# Patient Record
(demographics unavailable — no encounter records)

---

## 2025-04-23 NOTE — ADDENDUM
[FreeTextEntry1] : Documented by Dontae House acting as a scribe for Dr. Méndez and Christopher Perrin PA-C on 04/23/2025 and was presence for the following sections: Physical Exam; Data Reviewed; Assessment; Discussion/Summary. All medical record entries made by the Scribe were at my, Dr. Méndez, and Christopher Perrin, direction and personally dictated by me on 04/23/2025. I have reviewed the chart and agree that the record accurately reflects my personal performance of the history, physical exam, procedure and imaging. No (0)

## 2025-04-23 NOTE — DISCUSSION/SUMMARY
[de-identified] : Reviewed all X Ray images with patient today and interpretation was provided. Today MRI of left knee to eval proximal fibular head stress fracture. Advised patient to wear knee compression sleeve. Patient will follow up after MRI.     ----------------------------------------------- Home Exercise The patient is instructed on a home exercise program.   GIOVANNI YOUNG Acting as a Scribe for Dr. Dev MORENO, Giovanni Young, attest that this documentation has been prepared under the direction and in the presence of Provider Dr. Méndez.   Activity Modification The patient was advised to modify their activities.   Dx / Natural History The patient was advised of the diagnosis. The natural history of the pathology was explained in full to the patient in layman's terms. Several different treatment options were discussed and explained in full to the patient including the risks and benefits of both surgical and non-surgical treatments.  All questions and concerns were answered.   Pain Guide Activities The patient was advised to let pain guide the gradual advancement of activities.   REAGAN MORENO explained to the patient that rest, ice, compression, and elevation would benefit them. They may return to activity after follow-up or when they no longer have any pain.   The patient's current medication management of their orthopedic diagnosis was reviewed today: (1) We discussed a comprehensive treatment plan that included possible pharmaceutical management involving the use of prescription strength medications including but not limited to options such as oral Naprosyn 500mg BID, once daily Meloxicam 15 mg, or 500-650 mg Tylenol versus over the counter oral medications and topical prescription NSAID Pennsaid vs over the counter Voltaren gel. (2) There is a moderate risk of morbidity with further treatment, especially from use of prescription strength medications and possible side effects of these medications which include upset stomach with oral medications, skin reactions to topical medications and cardiac/renal issues with long term use. (3) I recommended that the patient follow-up with their medical physician to discuss any significant specific potential issues with long term medication use such as interactions with current medications or with exacerbation of underlying medical comorbidities. (4) The benefits and risks associated with use of injectable, oral or topical, prescription and over the counter anti-inflammatory medications were discussed with the patient. The patient voiced understanding of the risks including but not limited to bleeding, stroke, kidney dysfunction, heart disease, and were referred to the black box warning label for further information.

## 2025-04-23 NOTE — DATA REVIEWED
[FreeTextEntry1] : 4/23/25 OC X-RAY Left Knee 4 views: This scan was reviewed and interpreted by Dr. Méndez, and his findings are- patella hernando, otherwise unremarkable

## 2025-04-23 NOTE — HISTORY OF PRESENT ILLNESS
[de-identified] : The patient is a 59 year old RIGHT hand dominant female who presents today complaining of left lateral knee pain.   Date of Injury/Onset: end of 2024 Pain:    At Rest: 7/10  With Activity:  7/10  Mechanism of injury: NKI.  This is NOT a Work Related Injury being treated under Worker's Compensation. This is NOT an athletic injury occurring associated with an interscholastic or organized sports team. Quality of symptoms: achy, sore  Improves with: heat, elevation Worse with: sit to stand, pressure, extending Prior treatment: no Prior Imaging: no Out of work/sport: no School/Sport/Position/Occupation: [employer] Reviewer Additional Information: None

## 2025-04-28 NOTE — HISTORY OF PRESENT ILLNESS
[de-identified] : The patient is a 59 year old RIGHT hand dominant female who presents today complaining of left lateral knee pain.   Date of Injury/Onset: end of 2024 Pain:    At Rest: 7/10  With Activity:  7/10  Mechanism of injury: NKI.  This is NOT a Work Related Injury being treated under Worker's Compensation. This is NOT an athletic injury occurring associated with an interscholastic or organized sports team. Quality of symptoms: achy, sore  Improves with: heat, elevation Worse with: sit to stand, pressure, extending Treatment/imaging: MRI @ [facility]  Out of work/sport: no School/Sport/Position/Occupation: [employer] Reviewer Changes since last visit: No changes in pain and symptoms since last visit Additional Information: None

## 2025-04-28 NOTE — DISCUSSION/SUMMARY
[de-identified] :  Reviewed all MRI images with patient, interpretation was provided. Physical therapy prescribed for strengthening and stretching. Advised patient to wear Reparel knee sleeve, informational card provided. Rx Voltaren Patient will follow up in 6 weeks.    **if pain not improved will peruse injections    ----------------------------------------------- Home Exercise The patient is instructed on a home exercise program.  FAVIOLA FENTON Acting as a Scribe for Dr. Dev MORENO, Faviola Fenton, attest that this documentation has been prepared under the direction and in the presence of Provider Finesse Méndez MD.  Activity Modification The patient was advised to modify their activities.  Dx / Natural History The patient was advised of the diagnosis.  The natural history of the pathology was explained in full to the patient in layman's terms.  Several different treatment options were discussed and explained in full to the patient including the risks and benefits of both surgical and non-surgical treatments.  All questions and concerns were answered.  Pain Guide Activities The patient was advised to let pain guide the gradual advancement of activities.  RICE I explained to the patient that rest, ice, compression, and elevation would benefit them.  They may return to activity after follow-up or when they no longer have any pain.  The patient's current medication management of their orthopedic diagnosis was reviewed today: (1) We discussed a comprehensive treatment plan that included possible pharmaceutical management involving the use of prescription strength medications including but not limited to options such as oral Naprosyn 500mg BID, once daily Meloxicam 15 mg, or 500-650 mg Tylenol versus over the counter oral medications and topical prescription NSAID Pennsaid vs over the counter Voltaren gel. (2) There is a moderate risk of morbidity with further treatment, especially from use of prescription strength medications and possible side effects of these medications which include upset stomach with oral medications, skin reactions to topical medications and cardiac/renal issues with long term use. (3) I recommended that the patient follow-up with their medical physician to discuss any significant specific potential issues with long term medication use such as interactions with current medications or with exacerbation of underlying medical comorbidities. (4) The benefits and risks associated with use of injectable, oral or topical, prescription and over the counter anti-inflammatory medications were discussed with the patient. The patient voiced understanding of the risks including but not limited to bleeding, stroke, kidney dysfunction, heart disease, and were referred to the black box warning label for further information.

## 2025-04-28 NOTE — HISTORY OF PRESENT ILLNESS
[de-identified] : The patient is a 59 year old RIGHT hand dominant female who presents today complaining of left lateral knee pain.   Date of Injury/Onset: end of 2024 Pain:    At Rest: 7/10  With Activity:  7/10  Mechanism of injury: NKI.  This is NOT a Work Related Injury being treated under Worker's Compensation. This is NOT an athletic injury occurring associated with an interscholastic or organized sports team. Quality of symptoms: achy, sore  Improves with: heat, elevation Worse with: sit to stand, pressure, extending Treatment/imaging: MRI @ [facility]  Out of work/sport: no School/Sport/Position/Occupation: [employer] Reviewer Changes since last visit: No changes in pain and symptoms since last visit Additional Information: None

## 2025-04-28 NOTE — PHYSICAL EXAM
[de-identified] : Neurologic: normal mood and affect, orientated and able to communicate Constitutional: well developed and well nourished  Left Knee: max tenderness fibular head

## 2025-04-28 NOTE — ADDENDUM
[FreeTextEntry1] : Documented by Dontae House acting as a scribe for Dr. Méndez and Christopher Perrin PA-C on 04/23/2025 and was presence for the following sections: Physical Exam; Data Reviewed; Assessment; Discussion/Summary. All medical record entries made by the Scribe were at my, Dr. Méndez, and Christopher Perrin, direction and personally dictated by me on 04/23/2025. I have reviewed the chart and agree that the record accurately reflects my personal performance of the history, physical exam, procedure and imaging.

## 2025-04-28 NOTE — PHYSICAL EXAM
[de-identified] : Neurologic: normal mood and affect, orientated and able to communicate Constitutional: well developed and well nourished  Left Knee: max tenderness fibular head

## 2025-04-28 NOTE — PHYSICAL EXAM
[de-identified] : Neurologic: normal mood and affect, orientated and able to communicate Constitutional: well developed and well nourished  Left Knee: max tenderness fibular head

## 2025-04-28 NOTE — DISCUSSION/SUMMARY
[de-identified] :  Reviewed all MRI images with patient, interpretation was provided. Physical therapy prescribed for strengthening and stretching. Advised patient to wear Reparel knee sleeve, informational card provided. Rx Voltaren Patient will follow up in 6 weeks.    **if pain not improved will peruse injections    ----------------------------------------------- Home Exercise The patient is instructed on a home exercise program.  FAVIOLA FENTON Acting as a Scribe for Dr. Dev MORENO, Faviola Fenton, attest that this documentation has been prepared under the direction and in the presence of Provider Finesse Méndez MD.  Activity Modification The patient was advised to modify their activities.  Dx / Natural History The patient was advised of the diagnosis.  The natural history of the pathology was explained in full to the patient in layman's terms.  Several different treatment options were discussed and explained in full to the patient including the risks and benefits of both surgical and non-surgical treatments.  All questions and concerns were answered.  Pain Guide Activities The patient was advised to let pain guide the gradual advancement of activities.  RICE I explained to the patient that rest, ice, compression, and elevation would benefit them.  They may return to activity after follow-up or when they no longer have any pain.  The patient's current medication management of their orthopedic diagnosis was reviewed today: (1) We discussed a comprehensive treatment plan that included possible pharmaceutical management involving the use of prescription strength medications including but not limited to options such as oral Naprosyn 500mg BID, once daily Meloxicam 15 mg, or 500-650 mg Tylenol versus over the counter oral medications and topical prescription NSAID Pennsaid vs over the counter Voltaren gel. (2) There is a moderate risk of morbidity with further treatment, especially from use of prescription strength medications and possible side effects of these medications which include upset stomach with oral medications, skin reactions to topical medications and cardiac/renal issues with long term use. (3) I recommended that the patient follow-up with their medical physician to discuss any significant specific potential issues with long term medication use such as interactions with current medications or with exacerbation of underlying medical comorbidities. (4) The benefits and risks associated with use of injectable, oral or topical, prescription and over the counter anti-inflammatory medications were discussed with the patient. The patient voiced understanding of the risks including but not limited to bleeding, stroke, kidney dysfunction, heart disease, and were referred to the black box warning label for further information.

## 2025-04-28 NOTE — HISTORY OF PRESENT ILLNESS
[de-identified] : The patient is a 59 year old RIGHT hand dominant female who presents today complaining of left lateral knee pain.   Date of Injury/Onset: end of 2024 Pain:    At Rest: 7/10  With Activity:  7/10  Mechanism of injury: NKI.  This is NOT a Work Related Injury being treated under Worker's Compensation. This is NOT an athletic injury occurring associated with an interscholastic or organized sports team. Quality of symptoms: achy, sore  Improves with: heat, elevation Worse with: sit to stand, pressure, extending Treatment/imaging: MRI @ [facility]  Out of work/sport: no School/Sport/Position/Occupation: [employer] Reviewer Changes since last visit: No changes in pain and symptoms since last visit Additional Information: None

## 2025-04-28 NOTE — DISCUSSION/SUMMARY
[de-identified] :  Reviewed all MRI images with patient, interpretation was provided. Physical therapy prescribed for strengthening and stretching. Advised patient to wear Reparel knee sleeve, informational card provided. Rx Voltaren Patient will follow up in 6 weeks.    **if pain not improved will peruse injections    ----------------------------------------------- Home Exercise The patient is instructed on a home exercise program.  FAVIOLA FENTON Acting as a Scribe for Dr. Dev MORENO, Faviola Fenton, attest that this documentation has been prepared under the direction and in the presence of Provider Finesse Méndez MD.  Activity Modification The patient was advised to modify their activities.  Dx / Natural History The patient was advised of the diagnosis.  The natural history of the pathology was explained in full to the patient in layman's terms.  Several different treatment options were discussed and explained in full to the patient including the risks and benefits of both surgical and non-surgical treatments.  All questions and concerns were answered.  Pain Guide Activities The patient was advised to let pain guide the gradual advancement of activities.  RICE I explained to the patient that rest, ice, compression, and elevation would benefit them.  They may return to activity after follow-up or when they no longer have any pain.  The patient's current medication management of their orthopedic diagnosis was reviewed today: (1) We discussed a comprehensive treatment plan that included possible pharmaceutical management involving the use of prescription strength medications including but not limited to options such as oral Naprosyn 500mg BID, once daily Meloxicam 15 mg, or 500-650 mg Tylenol versus over the counter oral medications and topical prescription NSAID Pennsaid vs over the counter Voltaren gel. (2) There is a moderate risk of morbidity with further treatment, especially from use of prescription strength medications and possible side effects of these medications which include upset stomach with oral medications, skin reactions to topical medications and cardiac/renal issues with long term use. (3) I recommended that the patient follow-up with their medical physician to discuss any significant specific potential issues with long term medication use such as interactions with current medications or with exacerbation of underlying medical comorbidities. (4) The benefits and risks associated with use of injectable, oral or topical, prescription and over the counter anti-inflammatory medications were discussed with the patient. The patient voiced understanding of the risks including but not limited to bleeding, stroke, kidney dysfunction, heart disease, and were referred to the black box warning label for further information.

## 2025-04-28 NOTE — DATA REVIEWED
[FreeTextEntry1] : 4/23/25 OC X-RAY Left Knee 4 views: This scan was reviewed and interpreted by Dr. Méndez, and his findings are- patella hernando, otherwise unremarkable   04/23/25 OC MRI Left Knee: 1. Moderate patellofemoral joint osteoarthrosis and mild medial and lateral compartment osteoarthrosis. 2. No osseous stress reaction or evidence for stress fracture. 3. No proximal fibular stress fracture. 4. Very slightly thickened in appearance of the peroneal nerve with mild T2 signal hyperintensity, at the level of the proximal fibula. Findings are nonspecific but can be correlated clinically for peroneal neuritis 5. Small knee effusion. 6. Mild distal quadriceps tendinosis, without tear.

## 2025-06-14 NOTE — DATA REVIEWED
[FreeTextEntry1] : 04/23/25 OC MRI Left Knee: 1. Moderate patellofemoral joint osteoarthrosis and mild medial and lateral compartment osteoarthrosis. 2. No osseous stress reaction or evidence for stress fracture. 3. No proximal fibular stress fracture. 4. Very slightly thickened in appearance of the peroneal nerve with mild T2 signal hyperintensity, at the level of the proximal fibula. Findings are nonspecific but can be correlated clinically for peroneal neuritis 5. Small knee effusion. 6. Mild distal quadriceps tendinosis, without tear.  4/23/25 OC X-RAY Left Knee 4 views: This scan was reviewed and interpreted by Dr. Méndez, and his findings are- patella hernando, otherwise unremarkable

## 2025-06-14 NOTE — PHYSICAL EXAM
[de-identified] : Neurologic: normal mood and affect, orientated and able to communicate Constitutional: well developed and well nourished  Left Knee: IT band tenderness Lateral joint line tenderness

## 2025-06-14 NOTE — DISCUSSION/SUMMARY
[de-identified] : Patient improving with physical therapy. Patient will continue physical therapy for strengthening and stretching. Advised patient to continue physical therapy exercises at home. Patient will follow up as needed.     *Completing PT with [facility] at [facility] [place]*     ----------------------------------------------- Home Exercise The patient is instructed on a home exercise program.   GIOVANNI YOUNG Acting as a Scribe for Dr. Dev MORENO, Giovanni Young, attest that this documentation has been prepared under the direction and in the presence of Provider Dr. Méndez.   Activity Modification The patient was advised to modify their activities.   Dx / Natural History The patient was advised of the diagnosis. The natural history of the pathology was explained in full to the patient in layman's terms. Several different treatment options were discussed and explained in full to the patient including the risks and benefits of both surgical and non-surgical treatments.  All questions and concerns were answered.   Pain Guide Activities The patient was advised to let pain guide the gradual advancement of activities.   RICE I explained to the patient that rest, ice, compression, and elevation would benefit them. They may return to activity after follow-up or when they no longer have any pain.   The patient's current medication management of their orthopedic diagnosis was reviewed today: (1) We discussed a comprehensive treatment plan that included possible pharmaceutical management involving the use of prescription strength medications including but not limited to options such as oral Naprosyn 500mg BID, once daily Meloxicam 15 mg, or 500-650 mg Tylenol versus over the counter oral medications and topical prescription NSAID Pennsaid vs over the counter Voltaren gel. (2) There is a moderate risk of morbidity with further treatment, especially from use of prescription strength medications and possible side effects of these medications which include upset stomach with oral medications, skin reactions to topical medications and cardiac/renal issues with long term use. (3) I recommended that the patient follow-up with their medical physician to discuss any significant specific potential issues with long term medication use such as interactions with current medications or with exacerbation of underlying medical comorbidities. (4) The benefits and risks associated with use of injectable, oral or topical, prescription and over the counter anti-inflammatory medications were discussed with the patient. The patient voiced understanding of the risks including but not limited to bleeding, stroke, kidney dysfunction, heart disease, and were referred to the black box warning label for further information.

## 2025-06-14 NOTE — HISTORY OF PRESENT ILLNESS
[de-identified] : The patient is a 59 year old RIGHT hand dominant female who presents today complaining of left lateral knee pain.   Date of Injury/Onset: end of 2024 Pain:    At Rest: 0/10  With Activity:  3/10  Mechanism of injury: NKI.  This is NOT a Work Related Injury being treated under Worker's Compensation. This is NOT an athletic injury occurring associated with an interscholastic or organized sports team. Quality of symptoms: achy, sore  Improves with: heat, elevation Worse with: sit to stand, pressure, extending Treatment/imaging: PT @[facility]- working on ROM, quad and hip strengthening  Out of work/sport: no School/Sport/Position/Occupation: RN- [employer] Changes since last visit: Patient reports dec in pain since last visit. Has been going to PT 1-2x a week and see improvement. Has little to no pain with therex or ADL's. Additional Information: None

## 2025-06-14 NOTE — PHYSICAL EXAM
[de-identified] : Neurologic: normal mood and affect, orientated and able to communicate Constitutional: well developed and well nourished  Left Knee: IT band tenderness Lateral joint line tenderness

## 2025-06-14 NOTE — ADDENDUM
[FreeTextEntry1] : Documented by Dontae House acting as a scribe for Dr. Méndez and Christopher Perrin PA-C on 06/11/2025 and was presence for the following sections: Physical Exam; Data Reviewed; Assessment; Discussion/Summary. All medical record entries made by the Scribe were at my, Dr. Méndez, and Christopher Perrin, direction and personally dictated by me on 06/11/2025. I have reviewed the chart and agree that the record accurately reflects my personal performance of the history, physical exam, procedure and imaging.

## 2025-06-14 NOTE — PHYSICAL EXAM
[de-identified] : Neurologic: normal mood and affect, orientated and able to communicate Constitutional: well developed and well nourished  Left Knee: IT band tenderness Lateral joint line tenderness

## 2025-06-14 NOTE — HISTORY OF PRESENT ILLNESS
[de-identified] : The patient is a 59 year old RIGHT hand dominant female who presents today complaining of left lateral knee pain.   Date of Injury/Onset: end of 2024 Pain:    At Rest: 0/10  With Activity:  3/10  Mechanism of injury: NKI.  This is NOT a Work Related Injury being treated under Worker's Compensation. This is NOT an athletic injury occurring associated with an interscholastic or organized sports team. Quality of symptoms: achy, sore  Improves with: heat, elevation Worse with: sit to stand, pressure, extending Treatment/imaging: PT @[facility]- working on ROM, quad and hip strengthening  Out of work/sport: no School/Sport/Position/Occupation: RN- [employer] Changes since last visit: Patient reports dec in pain since last visit. Has been going to PT 1-2x a week and see improvement. Has little to no pain with therex or ADL's. Additional Information: None

## 2025-06-14 NOTE — HISTORY OF PRESENT ILLNESS
[de-identified] : The patient is a 59 year old RIGHT hand dominant female who presents today complaining of left lateral knee pain.   Date of Injury/Onset: end of 2024 Pain:    At Rest: 0/10  With Activity:  3/10  Mechanism of injury: NKI.  This is NOT a Work Related Injury being treated under Worker's Compensation. This is NOT an athletic injury occurring associated with an interscholastic or organized sports team. Quality of symptoms: achy, sore  Improves with: heat, elevation Worse with: sit to stand, pressure, extending Treatment/imaging: PT @[facility]- working on ROM, quad and hip strengthening  Out of work/sport: no School/Sport/Position/Occupation: RN- [employer] Changes since last visit: Patient reports dec in pain since last visit. Has been going to PT 1-2x a week and see improvement. Has little to no pain with therex or ADL's. Additional Information: None

## 2025-06-14 NOTE — DISCUSSION/SUMMARY
[de-identified] : Patient improving with physical therapy. Patient will continue physical therapy for strengthening and stretching. Advised patient to continue physical therapy exercises at home. Patient will follow up as needed.     *Completing PT with [facility] at [facility] in [place]*     ----------------------------------------------- Home Exercise The patient is instructed on a home exercise program.   GIOVANNI YOUNG Acting as a Scribe for Dr. Dev OMRENO, Giovanni Young, attest that this documentation has been prepared under the direction and in the presence of Provider Dr. Méndez.   Activity Modification The patient was advised to modify their activities.   Dx / Natural History The patient was advised of the diagnosis. The natural history of the pathology was explained in full to the patient in layman's terms. Several different treatment options were discussed and explained in full to the patient including the risks and benefits of both surgical and non-surgical treatments.  All questions and concerns were answered.   Pain Guide Activities The patient was advised to let pain guide the gradual advancement of activities.   RICE I explained to the patient that rest, ice, compression, and elevation would benefit them. They may return to activity after follow-up or when they no longer have any pain.   The patient's current medication management of their orthopedic diagnosis was reviewed today: (1) We discussed a comprehensive treatment plan that included possible pharmaceutical management involving the use of prescription strength medications including but not limited to options such as oral Naprosyn 500mg BID, once daily Meloxicam 15 mg, or 500-650 mg Tylenol versus over the counter oral medications and topical prescription NSAID Pennsaid vs over the counter Voltaren gel. (2) There is a moderate risk of morbidity with further treatment, especially from use of prescription strength medications and possible side effects of these medications which include upset stomach with oral medications, skin reactions to topical medications and cardiac/renal issues with long term use. (3) I recommended that the patient follow-up with their medical physician to discuss any significant specific potential issues with long term medication use such as interactions with current medications or with exacerbation of underlying medical comorbidities. (4) The benefits and risks associated with use of injectable, oral or topical, prescription and over the counter anti-inflammatory medications were discussed with the patient. The patient voiced understanding of the risks including but not limited to bleeding, stroke, kidney dysfunction, heart disease, and were referred to the black box warning label for further information.

## 2025-06-14 NOTE — DISCUSSION/SUMMARY
[de-identified] : Patient improving with physical therapy. Patient will continue physical therapy for strengthening and stretching. Advised patient to continue physical therapy exercises at home. Patient will follow up as needed.     *Completing PT with [facility] at [facility] [place]*     ----------------------------------------------- Home Exercise The patient is instructed on a home exercise program.   GIOVANNI YOUNG Acting as a Scribe for Dr. Dev MORENO, Giovanni Young, attest that this documentation has been prepared under the direction and in the presence of Provider Dr. Méndez.   Activity Modification The patient was advised to modify their activities.   Dx / Natural History The patient was advised of the diagnosis. The natural history of the pathology was explained in full to the patient in layman's terms. Several different treatment options were discussed and explained in full to the patient including the risks and benefits of both surgical and non-surgical treatments.  All questions and concerns were answered.   Pain Guide Activities The patient was advised to let pain guide the gradual advancement of activities.   RICE I explained to the patient that rest, ice, compression, and elevation would benefit them. They may return to activity after follow-up or when they no longer have any pain.   The patient's current medication management of their orthopedic diagnosis was reviewed today: (1) We discussed a comprehensive treatment plan that included possible pharmaceutical management involving the use of prescription strength medications including but not limited to options such as oral Naprosyn 500mg BID, once daily Meloxicam 15 mg, or 500-650 mg Tylenol versus over the counter oral medications and topical prescription NSAID Pennsaid vs over the counter Voltaren gel. (2) There is a moderate risk of morbidity with further treatment, especially from use of prescription strength medications and possible side effects of these medications which include upset stomach with oral medications, skin reactions to topical medications and cardiac/renal issues with long term use. (3) I recommended that the patient follow-up with their medical physician to discuss any significant specific potential issues with long term medication use such as interactions with current medications or with exacerbation of underlying medical comorbidities. (4) The benefits and risks associated with use of injectable, oral or topical, prescription and over the counter anti-inflammatory medications were discussed with the patient. The patient voiced understanding of the risks including but not limited to bleeding, stroke, kidney dysfunction, heart disease, and were referred to the black box warning label for further information.